# Patient Record
Sex: FEMALE | Race: WHITE
[De-identification: names, ages, dates, MRNs, and addresses within clinical notes are randomized per-mention and may not be internally consistent; named-entity substitution may affect disease eponyms.]

---

## 2017-10-02 ENCOUNTER — HOSPITAL ENCOUNTER (EMERGENCY)
Dept: HOSPITAL 84 - D.ER | Age: 42
Discharge: HOME | End: 2017-10-02
Payer: MEDICAID

## 2017-10-02 VITALS — BODY MASS INDEX: 28.4 KG/M2

## 2017-10-02 DIAGNOSIS — F17.200: ICD-10-CM

## 2017-10-02 DIAGNOSIS — R10.12: Primary | ICD-10-CM

## 2017-10-02 DIAGNOSIS — N39.0: ICD-10-CM

## 2017-10-02 LAB
APPEARANCE UR: CLEAR
BACTERIA #/AREA URNS HPF: (no result) /HPF
BILIRUB SERPL-MCNC: NEGATIVE MG/DL
COLOR UR: YELLOW
GLUCOSE SERPL-MCNC: NEGATIVE MG/DL
HCG UR QL: NEGATIVE
KETONES UR STRIP-MCNC: NEGATIVE MG/DL
MUCOUS THREADS #/AREA URNS LPF: (no result) /LPF
NITRITE UR-MCNC: NEGATIVE MG/ML
PH UR STRIP: 7 [PH] (ref 5–6)
PROT UR-MCNC: NEGATIVE MG/DL
RBC #/AREA URNS HPF: (no result) /HPF (ref 0–5)
SP GR UR STRIP: 1.01 (ref 1–1.02)
SQUAMOUS #/AREA URNS HPF: (no result) /HPF (ref 0–5)
UROBILINOGEN UR-MCNC: NORMAL MG/DL
WBC #/AREA URNS HPF: (no result) /HPF (ref 0–5)

## 2017-10-20 ENCOUNTER — HOSPITAL ENCOUNTER (OUTPATIENT)
Dept: HOSPITAL 84 - D.OPS | Age: 42
LOS: 1 days | Discharge: HOME | End: 2017-10-21
Attending: UROLOGY
Payer: MEDICAID

## 2017-10-20 VITALS — DIASTOLIC BLOOD PRESSURE: 66 MMHG | SYSTOLIC BLOOD PRESSURE: 112 MMHG

## 2017-10-20 VITALS — BODY MASS INDEX: 31.2 KG/M2

## 2017-10-20 DIAGNOSIS — Z01.812: ICD-10-CM

## 2017-10-20 DIAGNOSIS — F17.200: ICD-10-CM

## 2017-10-20 DIAGNOSIS — N20.2: Primary | ICD-10-CM

## 2017-10-20 LAB
BASOPHILS NFR BLD AUTO: 0.2 % (ref 0–2)
EOSINOPHIL NFR BLD: 1.4 % (ref 0–7)
ERYTHROCYTE [DISTWIDTH] IN BLOOD BY AUTOMATED COUNT: 12.8 % (ref 11.5–14.5)
HCG SERPL-ACNC: NEGATIVE M[IU]/ML
HCT VFR BLD CALC: 37.4 % (ref 36–48)
HGB BLD-MCNC: 12.7 G/DL (ref 12–16)
IMM GRANULOCYTES NFR BLD: 0.2 % (ref 0–5)
LYMPHOCYTES NFR BLD AUTO: 26.1 % (ref 15–50)
MCH RBC QN AUTO: 31.7 PG (ref 26–34)
MCHC RBC AUTO-ENTMCNC: 34 G/DL (ref 31–37)
MCV RBC: 93.3 FL (ref 80–100)
MONOCYTES NFR BLD: 5.7 % (ref 2–11)
NEUTROPHILS NFR BLD AUTO: 66.4 % (ref 40–80)
PLATELET # BLD: 267 10X3/UL (ref 130–400)
PMV BLD AUTO: 8.8 FL (ref 7.4–10.4)
RBC # BLD AUTO: 4.01 10X6/UL (ref 4–5.4)
WBC # BLD AUTO: 8.5 10X3/UL (ref 4.8–10.8)

## 2017-10-20 NOTE — NUR
1800 UPDATED PATIENT OF DELAY WITH SURGERY. TOLD WILL BE MOVING TO
ANOTHER ROOM 2208 THAN TO SURGERY.

## 2017-10-20 NOTE — NUR
PT IN BED WITH  AT THE BEDSIDE AWAITING SURGERY. ASSIGNED NURSE HAS
ASSISTED HER WITH ALL THE PREP SHE NEEDS FOR PROCEDURE. RESPIRATIONS EASY AND
NO DISTRESS NOTED. THE BED IS LOW, RAILS UP X'S 2 WITH THE CALL LIGHT AT HAND.

## 2017-10-20 NOTE — NUR
PT AMBULATED FROM OUTPT WITH NURSE AND  AT SIDE.  ORIENTED TO ROOM AND
CALL LIGHT.  REPORTS NO NEEDS AT THIS TIME.  CALL LIGHT AT SIDE.

## 2017-10-22 ENCOUNTER — HOSPITAL ENCOUNTER (EMERGENCY)
Dept: HOSPITAL 84 - D.ER | Age: 42
Discharge: HOME | End: 2017-10-22
Payer: MEDICAID

## 2017-10-22 VITALS — BODY MASS INDEX: 31.2 KG/M2

## 2017-10-22 DIAGNOSIS — G89.18: Primary | ICD-10-CM

## 2017-10-24 NOTE — OP
PATIENT NAME:  DREW HUERTA                         MEDICAL RECORD: [id number]
:[birth year]                                             LOCATION:D.OPS          
                                                         ADMISSION DATE:        
SURGEON:  CHRISTOPHER CORCOARN MD              
 
 
DATE OF OPERATION:  10/21/2017
 
SURGEON:  Christopher Corcoran MD
 
ANESTHESIA:  TIVA by Elroy Dumont CRNA.
 
PREOPERATIVE DIAGNOSES:  Left ureteral and renal stones, 6 mm each times 2.
 
PROCEDURES:  Cystoscopy and left ureteral stent insertion with string attached
6-French x 22 cm.
 
FINDINGS:  Radiodense stones, one in the proximal left ureter and one in the
left kidney.
 
ESTIMATED BLOOD LOSS:  Loss None.
 
CLINICAL HISTORY:  This is a 42-year-old female, who was found to have bilateral
renal stones on the CT scan in .  She did not do anything about these
stones.  Recently, she complained of left-sided flank pain.  A CT scan shows one
of the 6-mm stones in the left kidney has now gone into the left proximal
ureter.  There is still another 6-mm stone in the left kidney.  She also has a
5-mm stone, which is nonobstructive in the right kidney.  She comes now to have
a left ureteral stent inserted and we will treat her stones with ESWL in the
future.  She was given Ancef IV on call to the OR.
 
DESCRIPTION OF PROCEDURE:  The patient was given IV sedation.  She was then
placed in the dorsal lithotomy position and prepped and draped.  A 21-French
cystoscope with 30-degree lens was used for visualization.  She has single
ureteral orifices on each side.  No bladder tumors were seen.  On fluoroscopy,
the stones were easily seen as they are quite radiodense.  A Sensor wire was
placed into the left ureteral orifice and placed up into the left renal pelvis. 
Over the wire, a 6-French x 22 cm ureteral stent was inserted.  Once the
proximal end of the stent was within the kidney, the wire was entirely
withdrawn.  The distal end of the stent was pushed into the bladder using the
pusher.  The bladder was then emptied through the cystoscope, sheath, and then
the scope was entirely removed.  The string on the distal end of the stent is
maintained.  It was taped to the patient's suprapubic area with a small piece of
Tegaderm.  The patient was awakened and brought to recovery room.  She will be
sent home.  I will arrange for outpatient lithotripsy in the near future.
 
TRANSINT:[id number] Voice Confirmation ID: [id number] DOCUMENT ID: [id number]
                                           
                                           CHRISTOPHER CORCORAN MD              
 
 
 
Electronically Signed by CHRISTOPHER CORCORAN on 10/24/17 at 1057
CC:                                                             [id number]
DICTATION DATE: 10/21/17 0018     :     10/21/17 0105      [facility] 
                                                                      10/21/17
[place] [id number] [place] [id number]

## 2017-10-26 ENCOUNTER — HOSPITAL ENCOUNTER (OUTPATIENT)
Dept: HOSPITAL 84 - D.OPS | Age: 42
Discharge: HOME | End: 2017-10-26
Attending: UROLOGY
Payer: MEDICAID

## 2017-10-26 VITALS — BODY MASS INDEX: 30.3 KG/M2

## 2017-10-26 DIAGNOSIS — Z01.812: ICD-10-CM

## 2017-10-26 DIAGNOSIS — N20.0: Primary | ICD-10-CM

## 2017-10-26 DIAGNOSIS — N20.1: ICD-10-CM

## 2017-10-26 NOTE — OP
PATIENT NAME:  DREW HUERTA                         MEDICAL RECORD: [id number]
:[birth year]                                             LOCATION:D.OPS          
                                                         ADMISSION DATE:        
SURGEON:  CHRISTOPHER CORCORAN MD              
 
 
DATE OF OPERATION:  10/26/2017
 
SURGEON:  Christopher Corcoran MD
 
ANESTHESIA:  TIVA by Selam Ureña CRNA
 
PREOPERATIVE DIAGNOSES:  Left proximal ureteral stone, 6 mm and left renal
stone, 6 mm.
 
PROCEDURE:  Left ESWL times 3000 shocks.
 
FINDINGS:  Radiodense stone.
 
BLOOD LOSS:  None.
 
CLINICAL HISTORY:  This is a 42-year-old female with a prior history of kidney
stones.  She has a strong family history of kidney stones in her sister, her
brother, and her mother.  Recently, she had left renal colic from a renal stone,
which went into the UP junction.  I have inserted a left ureteral stent.  She
comes now to have lithotripsy to try to clear out the proximal ureteral stone. 
She was given Ancef on call to the OR.
 
DESCRIPTION OF PROCEDURE:  The patient was placed on treatment table.  The
stones were radiodense and easily targeted.  We targeted the proximal ureteral
stone on the left side in 2 planes.  Then, 3000 shocks were given to the stone. 
The stone did not visibly appear to be altered and so it may not have broken up
at all.  I will see her in the office in 1 week's time with a KUB.  If the stone
was untouched by the lithotripsy, then we may have to arrange for a percutaneous
nephrolithotomy to get the stones out.
 
TRANSINT:[id number] Voice Confirmation ID: [id number] DOCUMENT ID: [id number]
                                           
                                           CHRISTOPHER CORCORAN MD              
 
 
 
Electronically Signed by CHRISTOPHER CORCORAN on 10/26/17 at 1441
 
 
 
 
 
 
 
CC:                                                             [id number]
DICTATION DATE: 10/26/17 1348     :     10/26/17 1354      [facility]                                          
[address] [place] [address]

## 2017-11-03 ENCOUNTER — HOSPITAL ENCOUNTER (OUTPATIENT)
Dept: HOSPITAL 84 - D.RAD | Age: 42
Discharge: HOME | End: 2017-11-03
Attending: UROLOGY
Payer: MEDICAID

## 2017-11-03 VITALS — BODY MASS INDEX: 30.3 KG/M2

## 2017-11-03 DIAGNOSIS — N20.0: Primary | ICD-10-CM

## 2017-11-10 ENCOUNTER — HOSPITAL ENCOUNTER (OUTPATIENT)
Dept: HOSPITAL 84 - D.LABREF | Age: 42
Discharge: HOME | End: 2017-11-10
Attending: UROLOGY
Payer: MEDICAID

## 2017-11-10 VITALS — BODY MASS INDEX: 30.3 KG/M2

## 2017-11-10 DIAGNOSIS — N39.0: Primary | ICD-10-CM

## 2017-11-17 ENCOUNTER — HOSPITAL ENCOUNTER (OUTPATIENT)
Dept: HOSPITAL 84 - D.LABREF | Age: 42
Discharge: HOME | End: 2017-11-17
Attending: INTERNAL MEDICINE
Payer: MEDICAID

## 2017-11-17 VITALS — BODY MASS INDEX: 30.3 KG/M2

## 2017-11-17 DIAGNOSIS — N39.0: Primary | ICD-10-CM

## 2017-11-29 LAB
ANION GAP SERPL CALC-SCNC: 12.6 MMOL/L (ref 8–16)
APTT BLD: 25.8 SECONDS (ref 22.8–39.4)
BASOPHILS NFR BLD AUTO: 0.2 % (ref 0–2)
BUN SERPL-MCNC: 15 MG/DL (ref 7–18)
CALCIUM SERPL-MCNC: 8.9 MG/DL (ref 8.5–10.1)
CHLORIDE SERPL-SCNC: 103 MMOL/L (ref 98–107)
CO2 SERPL-SCNC: 26.2 MMOL/L (ref 21–32)
CREAT SERPL-MCNC: 0.8 MG/DL (ref 0.6–1.3)
EOSINOPHIL NFR BLD: 1.6 % (ref 0–7)
ERYTHROCYTE [DISTWIDTH] IN BLOOD BY AUTOMATED COUNT: 13 % (ref 11.5–14.5)
GLUCOSE SERPL-MCNC: 105 MG/DL (ref 74–106)
HCT VFR BLD CALC: 36.9 % (ref 36–48)
HGB BLD-MCNC: 12.5 G/DL (ref 12–16)
IMM GRANULOCYTES NFR BLD: 0.1 % (ref 0–5)
INR PPP: 0.97 (ref 0.85–1.17)
LYMPHOCYTES NFR BLD AUTO: 21.8 % (ref 15–50)
MCH RBC QN AUTO: 31.7 PG (ref 26–34)
MCHC RBC AUTO-ENTMCNC: 33.9 G/DL (ref 31–37)
MCV RBC: 93.7 FL (ref 80–100)
MONOCYTES NFR BLD: 7 % (ref 2–11)
NEUTROPHILS NFR BLD AUTO: 69.3 % (ref 40–80)
OSMOLALITY SERPL CALC.SUM OF ELEC: 276 MOSM/KG (ref 275–300)
PLATELET # BLD: 273 10X3/UL (ref 130–400)
PMV BLD AUTO: 8.6 FL (ref 7.4–10.4)
POTASSIUM SERPL-SCNC: 3.8 MMOL/L (ref 3.5–5.1)
PROTHROMBIN TIME: 12.5 SECONDS (ref 11.6–15)
RBC # BLD AUTO: 3.94 10X6/UL (ref 4–5.4)
SODIUM SERPL-SCNC: 138 MMOL/L (ref 136–145)
WBC # BLD AUTO: 9.8 10X3/UL (ref 4.8–10.8)

## 2017-11-30 ENCOUNTER — HOSPITAL ENCOUNTER (OUTPATIENT)
Dept: HOSPITAL 84 - D.MS | Age: 42
LOS: 1 days | Discharge: HOME | End: 2017-12-01
Attending: UROLOGY
Payer: MEDICAID

## 2017-11-30 VITALS — SYSTOLIC BLOOD PRESSURE: 96 MMHG | DIASTOLIC BLOOD PRESSURE: 62 MMHG

## 2017-11-30 VITALS — DIASTOLIC BLOOD PRESSURE: 62 MMHG | SYSTOLIC BLOOD PRESSURE: 96 MMHG

## 2017-11-30 VITALS
HEIGHT: 61 IN | BODY MASS INDEX: 28.38 KG/M2 | BODY MASS INDEX: 28.38 KG/M2 | BODY MASS INDEX: 28.38 KG/M2 | WEIGHT: 150.32 LBS

## 2017-11-30 VITALS — DIASTOLIC BLOOD PRESSURE: 70 MMHG | SYSTOLIC BLOOD PRESSURE: 100 MMHG

## 2017-11-30 VITALS — DIASTOLIC BLOOD PRESSURE: 52 MMHG | SYSTOLIC BLOOD PRESSURE: 100 MMHG

## 2017-11-30 DIAGNOSIS — N20.2: Primary | ICD-10-CM

## 2017-11-30 DIAGNOSIS — Z01.812: ICD-10-CM

## 2017-11-30 DIAGNOSIS — Z87.442: ICD-10-CM

## 2017-11-30 LAB — HCG UR QL: NEGATIVE

## 2017-11-30 NOTE — NUR
CYSTO DONE PRIOR TO TURNING FOR PERC NEPHRO PROCEDURE, SAFETY
MEASURES TAKEN PRESSURE POINTS PADDED AND SEURED ALL AREAS CHECKED
FOR IMPINGEMENT NONE FOUND, DR CORCORAN PRESENT FOR POSITIONING, [facility].

## 2017-11-30 NOTE — HEMODYNAMI
PATIENT:DREW HUERTA                                MEDICAL RECORD: [id number]
: [birth year]                                            LOCATION:D.OPS          
ACCT# [id number]                                       ADMISSION DATE: 17
 
 
 Generatedon:20179:30
Patient name: DREW HUERTA Patient #: [id number] Visit #: [id number] SSN: YOB: 1975 Date of study: 2017
Page: Of
Hemodynamic Procedure Report
****************************
Patient Data
Patient Demographics
Procedure consent was obtained
First Name: DREW            Gender: Female
Last Name: BRADLEY       : 1975
Middle Initial: A           Age: 42 year(s)
Patient #: [id number]       Race: Unknown
Visit #: [id number]
Accession #:
[id number]
Additional ID: [id number]
Contact details
Address: [address] [place] [address]    Phone: [phone]
[place] [address]
State: [place]
City: [place]
Zip code: [address]
Past Medical History
Allergies: No known allergies
Admission
Admission Data
Admission Date: 2017  Admission Time: 5:55
Procedure
Procedure Types
Cath Procedure
Peripheral Cath Diagnostic Procedure
Cath Peripheral
Nephro
Nephrostomy Tubes
Procedure Description
Procedure Date
Procedure Date: 2017
Procedure Start Time: 9:01
Procedure Staff
Name                            Function
Phoenix Morales MD                  Performing Physician
Nessa Duran RT                  Monitor
Justin Lott RT              Scrub
Jennifer Pearce RN                Nurse
Nessa Duran RT                  Circulator
Procedure Data
Cath Procedure
Fluoroscopy
Diagnostic fluoroscopy      Total fluoroscopy Time: 3
time: 3 min                 min
Diagnostic fluoroscopy      Total fluoroscopy dose: 103
dose: 103 mGy               mGy
 
Contrast Material
Contrast Material Type                       Amount (ml)
Isovue 300                                   25
Procedure Medications
Medication           Administration Route Dosage
Oxygen               NC                   3 l/min
Lidocaine 1%         added to field       20
Heparin Flush Bag                         2 bags
(1000units/500ml NS)
Versed               I.V.                 1 mg
Fentanyl             I.V.                 50 mcg
Versed               I.V.                 1 mg
Fentanyl             I.V.                 50 mcg
Versed               I.V.                 1 mg
Fentanyl             I.V.                 50 mcg
Versed               I.V.                 1 mg
Fentanyl             I.V.                 50 mcg
Hemodynamics
Rest
Heart Rate: 79 (bpm)
Snapshots
Pre Cath      Intra         NCS           Post Cath
Vital Signs
Time    Heart  Resp   SPO2 etCO2   NIBP (mmHg) Rhythm  Pain    Sedation
Rate   (ipm)  (%)  (mmHg)                      Status  Level
(bpm)
8:40:10 70     14     100  27.9    113/77(96)  NSR     0 (11)  10(A)
, No
pain
8:44:21 74     9      100  33.1    111/79(105) NSR     0 (11)  10(A)
, No
pain
8:48:31 75     10     100  33.2    108/76(86)  NSR     0 (11)  10(A)
, No
pain
8:52:43 76     12     100  32.4    128/70(95)  NSR     0 (11)  10(A)
, No
pain
8:57:05 69     11     100  34.7    108/65(97)  NSR     0 (11)  10(A)
, No
pain
9:02:04 68     11     100  34.7    116/71(99)  NSR     0 (11)  10(A)
, No
pain
9:06:18 67     14     100  33.9    115/74(94)  NSR     0 (11)  10(A)
, No
pain
9:10:30 77     18     100  30.2    102/74(82)  NSR     0 (11)  10(A)
, No
pain
9:14:38 75     14     100  37.7    107/72(85)  NSR     0 (11)  10(A)
, No
pain
9:18:48 70     12     99   36.9    112/70(91)  NSR     0 (11)  10(A)
, No
pain
9:22:56 80     13     99   30.9    122/83(95)  NSR     0 (11)  10(A)
, No
pain
9:27:02 81     21     98   6.7     114/79(93)  NSR     0 (11)  10(A)
 
, No
pain
Medications
Time    Medication       Route  Dose  Verified Delivered Reason     Notes  Effec
tiveness
by       by
8:39:57 Oxygen           NC     3     Jennifer Rodriguez    used for
l/min Ramses Pearce RN procedure
RN
8:40:15 Lidocaine 1%     added  20ml  Jennifer Garibay      for local
to     vial  Ramses Morales MD anesthetic
field        RN
8:41:03 Heparin Flush           2     Jennifer Garibay      used for
Bag                     bags  Ramses Morales MD procedure
(1000units/500ml              RN
NS)
9:04:36 Versed           I.V.   1 mg  Phoenix     Jennifer    for
Ramses Morales RN sedation
MD
9:05:30 Fentanyl         I.V.   50    Phoenix     Jennifer    for
mcg   Ramses Morales RN sedation
MD
9:11:13 Versed           I.V.   1 mg  Phoenix     Jennifer    for
Ramses Morales RN sedation
MD
9:11:22 Fentanyl         I.V.   50    Phoenix     Jennifer    for
mcg   Ramses Morales RN sedation
MD
9:19:20 Versed           I.V.   1 mg  Phoenix     Jennifer    for
Ramses Morales RN sedation
MD
9:19:28 Fentanyl         I.V.   50    Phoenix     Jennifer    for
mcg   Ramses Morales RN sedation
MD
9:26:32 Versed           I.V.   1 mg  Phoenix     Jennifer    for
Ramses Morales RN sedation
MD
9:26:41 Fentanyl         I.V.   50    Phoenix     Jennifer    for
mcg   Ramses Morales RN sedation
MD
Procedure Log
Time    Note
8:27:37 Use device set IR Diagnostic
8:27:39 Sterile Angiographic Pack opened to sterile field.
8:27:40 Bag Decanter (2002S) opened to sterile field.
8:27:52 KIT, INTRODUCER ACCUSTICK II W/C opened to sterile field.
8:28:03 Time tracking: Regular hours
8:28:19 Plan of Care:Hemodynamics will remain stable., Cardiac rhythm will
remain stable., Comfort level will be maintained., Respiratory function
will remain adequate., Patient/ family verbilizes understanding of
procedure., Procedure tolerated without complication., Recovers from
procedure without complications..
8:28:44 Patient received from Outpatients to IR Alert and oriented. Tansferred
to table in Supine position.
8:29:00 Signed procedure consent form obtained from patient.
8:29:08 H&P Date Dictated: 2017 Within 30 days and on chart..
8:29:11 Pre-procedure instructions explained to patient.
8:29:11 Pre-op teaching completed and patient verbalized understanding.
8:29:13 Family in waiting room.
8:29:16 Patient NPO since Midnight.
 
8:29:57 Patient allergic to No known allergies
8:30:03 Is the patient allergic to Iodine/contrast media? No.
8:30:06 Is patient on blood thinner?No
8:30:09 Patient diabetic? No.
8:30:12 ------------------------------------------------------------------------
8:30:13 ----Pre-sedation anethsthesia assessment.----
8:30:16 Previous problem with sedation/anesthesia? No ?
8:30:19 Snore? No
8:30:21 Sleep apnea? No
8:30:25 Deviated septum? No
8:30:27 Opens mouth fully? Yes
8:30:29 Sticks out tongue? Yes
8:30:32 Airway obstruction? No ?
8:30:36 Dentures? No ?
8:30:51 IV patent on arrival in left hand with 0.9% NaCl at [facility].
8:30:57 ------------------------------------------------------------------------
8:38:40 ECG and BP/O2 sat monitors applied to patient.
8:38:41 Vital chart was started
8:38:42 Baseline sample Acquired.
8:38:43 Full Disclosure recording started
8:38:44 ------------------------------------------------------------------------
8:39:00 Left renal area was prepped with chlora-prep and draped in sterile
fashion
8:39:57 Oxygen 3 l/min NC was administered by Jennifer Pearce RN; used for
procedure;
8:40:15 Lidocaine 1% 20ml vial added to field was administered by Phoenix Morales MD; for local anesthetic;
8:41:03 Heparin Flush Bag (1000units/500ml NS) 2 bags was administered by Phoenix Morales MD; used for procedure;
8:59:00 Physician arrived
9:00:31 --------ALL STOP TIME OUT------
9:00:32 Final Timeout: patient, procedure, and site verified with staff and
physician. All members of the team are in agreement.
9:00:40 Sedation plan: IV Moderate Sedation Medication:Versed, Fentanyl
9:00:45 Patient pain scale 0/10 ?.
9:00:50 ------------------------------------------------------------------------
9:01:06 Procedure started.
9:01:23 Local anesthetic to Lt renal area with Lidocaine 1% by Phoenix Morales MD.**INITIAL ACCESS ONLY**
9:04:36 Versed 1 mg I.V. was administered by Jennifer Pearce RN; for sedation;
9:05:30 Fentanyl 50 mcg I.V. was administered by Jennifer Pearce RN; for sedation;
9:06:22 CHIBA 22 X 15 needle opened to sterile field.
9:06:53 ultrasound guidance used
9:11:13 Versed 1 mg I.V. was administered by Jennifer Pearce RN; for sedation;
9:11:22 Fentanyl 50 mcg I.V. was administered by Jennifer Pearce RN; for sedation;
9:19:20 Versed 1 mg I.V. was administered by Jennifer Pearce RN; for sedation;
9:19:28 Fentanyl 50 mcg I.V. was administered by Jennifer Pearce RN; for sedation;
9:21:33 Terumo TORQUE DEVICE PLASTIC .038 opened to sterile field.
9:21:39 Terumo ANGLE 180L glide wire opened to sterile field.
9:23:21 Terumo 5FR ANGLED 65CM glide catheter opened to sterile field.
9:23:37 Terumo Angled SS 180cm glide wire opened to sterile field.
9:26:13 Tegaderm 4 x 4 (1626W) opened to sterile field.
9:26:32 Versed 1 mg I.V. was administered by Jennifer Pearce RN; for sedation;
9:26:41 Fentanyl 50 mcg I.V. was administered by Jennifer Pearce RN; for sedation;
9:27:14 5fr glidecath placed in the left kidney
9:28:12 Procedure ended.(Physican Out)
9:28:27 Fluoroscopy time 03.00 minutes.
9:28:35 Fluoroscopy dose: 103 mGy
9:28:35 Flurop Dose total: 103
9:28:39 Contrast amount:Isovue 300 25ml.
 
9:28:45 Procedure and supply charges have been captured, reviewed, submitted and
are correct.
9:28:52 Report given to Outpatients.
9:29:03 Patient transfered to Outpatients with Stretcher.
9:29:59 Vital chart was stopped
Device Usage
Item Name    Manufacture  Quantity  Catalog     Hospital Part    Current Minimal
 Lot# /
Number      Charge   Number  Stock   Stock   Serial#
Code
Sterile      Cardinal     1         [id number]  [id number]           [id number]  5
[employer]
Pack
Bag Decanter Micro[employer]     1         S       [id number]   95276   730753  5
(S)      [employer]
KIT,         Bend       1         [id number]  867755   261490  649718  5
INTRODUCER   Scientific
ACCUSTICK II
W/C
CHIBA 22 X   [employer] 1         [id number]      [id number]           083007  5
15 needle
Terumo       Bend       1         TD01        467919   692522  241934  5
TORQUE       Scientific
DEVICE
PLASTIC .038
Terumo ANGLE Terumo       1         [id number]      950632   410526  918191  5
180L glide
wire
Terumo 5FR   Terumo       1                482370           612689  5
ANGLED 65CM
glide
catheter
Terumo       Terumo       1         TW6098      904879           717893  5
Angled SS
180cm glide
wire
Tegaderm 4 x 3M           1         1626W       613278   107146  821922  5
4 (1626W)
Signature Audit [place]
Stage           Time        Signature      Unsigned
Intra-Procedure 2017  Nessa Duran
9:29:56 AM  RT(R)
Signatures
Monitor : Nessa Duran RT Signature :
______________________________
Date : ______________ Time :
______________
 
 
 
 
 
 
 
 
[facility]                                          
[address] [place] [address]

## 2017-11-30 NOTE — NUR
PATIENT WAS POSITIONED LITHOTOMY AFTER PERC NEPHRO FOR CYSTO
URETEROSCOPY, PATIENT MOVED WITH CARE AND PRECAUTIONS, DR CORCORAN PRESENT
FOR DERRELL ZHANG

## 2017-12-01 VITALS — SYSTOLIC BLOOD PRESSURE: 131 MMHG | DIASTOLIC BLOOD PRESSURE: 77 MMHG

## 2017-12-01 VITALS — DIASTOLIC BLOOD PRESSURE: 70 MMHG | SYSTOLIC BLOOD PRESSURE: 109 MMHG

## 2017-12-01 VITALS — SYSTOLIC BLOOD PRESSURE: 104 MMHG | DIASTOLIC BLOOD PRESSURE: 62 MMHG

## 2017-12-01 VITALS — DIASTOLIC BLOOD PRESSURE: 70 MMHG | SYSTOLIC BLOOD PRESSURE: 106 MMHG

## 2017-12-01 NOTE — NUR
DC INSTRUCTIONS EXPLAINED TO PATIENT AND . BOTH VERBALIZED
UNDERSTANDING. RX FOR NORCO HANDED TO PATIENT.

## 2017-12-01 NOTE — NUR
AWAKE AND ALERT. ORIENTED X3. UP IN ROOM PER SELF. DENIES NEEDS. REPORTS PAIN
IMPROVED AT THIS TIME.

## 2017-12-01 NOTE — NUR
ASSESSMENT COMPLETED. STATES HER HEAD IS HURTING AT AN 8 ON SCALE OF 0-10.
EXPLAINED TO PATIENT THAT IT COULD BE A REBOUND HA FROM ALL OF THE PAIN MEDS
SHE HAS HAD. AM MEDS ADMINISTERED WITH NORCO PO. ASSISTED PATIENT TO CHAIR FOR
BREAKFAST BECAUSE SHE HAS NOT BEEN OOB YET. SCDs TO BLE. CALL LIGHT IN REACH.
WILL CONTINUE WITH PLAN OF CARE.

## 2017-12-01 NOTE — OP
PATIENT NAME:  DREW HUERTA                         MEDICAL RECORD: [id number]
:[birth year]                                             LOCATION:D.MS [id number]
                                                         ADMISSION DATE:        
SURGEON:  CHRISTOPHER CORCORAN MD              
 
 
DATE OF OPERATION:  2017
 
SURGEON:  Christopher Corcoran MD
 
ANESTHESIA:  General anesthesia by Evans Zayas MD
 
PREOPERATIVE DIAGNOSES:  Left renal stone, 6 mm; and radiodense left proximal
ureteral 6-mm stone.
 
POSTOPERATIVE DIAGNOSES:  Left renal stone, 6 mm; and radiodense left proximal
ureteral 6-mm stone.
 
PROCEDURES:  Cystoscopy, left percutaneous nephrolithotomy, left ureteroscopy
and holmium laser lithotripsy, stone extraction, and left nephrostomy tube
insertion.
 
FINDINGS:  Radiodense stones.  Cluster of small rounded beads in the kidney,
adding up to 6 mm in size.  In the ureter, was a very hard impacted
calcification.
 
ESTIMATED BLOOD LOSS:  Less than 100 mL.
 
CLINICAL HISTORY:  This is a 42-year-old female with a prior history of kidney
stones.  She initially presented with acute left flank pain due to a 6-mm stone
lodged in the proximal ureter.  There was also a nonobstructive 6-mm stone,
which was radiodense in the left kidney.  She had a left ureteral stent inserted
and we took her to lithotripsy.  The lithotripsy did not break either the stones
up at all on followup KUB.  She comes now to have a left percutaneous
nephrolithotomy with view to taking out the stone in the kidney and also to
remove the stone in the proximal ureter by ureteroscopy in an antegrade fashion.
 Earlier today, she had placement of a nephroureteral catheter which goes from
the skin into the left kidney, down the ureter, and into the bladder.  Her
current stent is also still present.  She was given Ancef on call to the OR. 
She is not allergic to any medication.
 
DESCRIPTION OF PROCEDURE:  The patient was initially prepped and draped in
supine position after having been given general anesthetic.  She was frog legged
and then we performed cystoscopy with rigid cystoscope.  The old ureteral stent
was removed entirely.  The nephroureteral access tube was drawn out through the
urethra.  It was in the position between the legs.  We then turned over into
prone position.  She was then reprepped and redraped.  The nephroureteral tube
was accessed using an Amplatz Super Stiff wire.  Once this wire was down through
the nephroureteral tube and out through its exit between the legs, a clamp was
applied to the distal end of the wire with a hemostat.  The nephroureteral
access sheath could then be completely removed without losing our access.  A
small incision was made on either side of the wire sufficient to allow our
working sheath to be placed.  A dual lumen catheter was placed down the proximal
ureter and a second wire, which was a Sensor wire, was placed down the ureter
into the bladder.  This acts as a safety wire.  The Sensor wire was clamped to
the drapes and we worked over the Super Stiff wire.  A NephroMax tract dilation
balloon was used to dilate the tract with 20 atmospheres of pressure.  The
30-French working sheath was then placed over the balloon into position.  Going
 
 
 
OPERATIVE REPORT                               [id number]    DREW HUERTA       
 
 
into the kidney, we initially had a lot of difficulty visualizing the stone in
the kidney.  Finally, I took a flexible nephroscope and looked into each of the
calices.  These did not show any obvious stones.  Eventually, as we reverted
back to the rigid nephroscope, we identified some apparent material stuck under
the mucosa of the renal pelvis.  As we unroofed this mucosa by abrading the
scope against it, a large cluster of small rounded beads came out.  These were
removed using the ultrasonic probe and as well by just using grasping forceps. 
We then turned our attention to the ureteral stone.  The flexible nephroscope
was too large to go down the UP junction.  We switched over to the flexible
ureteroscope.  We went down to the level of the stone.  Attempts to basket the
stone were unsuccessful as the stone was completely impacted in the ureter.  A
365 micron holmium laser fiber was then placed and we fragmented the stone using
holmium laser energy.  However, the basket still would not pass from an
antegrade fashion.  At this point, I decided to place the patient's nephrostomy
tube and try to complete the ureteroscopy from below.  Therefore, the 22-French
Malecot nephrostomy tube was placed into the renal pelvis.  The working sheath
was removed.  The Super Stiff wire was entirely removed by pulling it distally
using the clamp on the wire.  The nephrostomy tube was sutured to the skin using
a 2-0 nylon stitch.  Dressings were applied and the nephrostomy tube was put to
bag drainage.  The patient was then turned back into supine position using a
stretcher.  She was then placed into dorsal lithotomy position.  She was
reprepped and redraped.  Using cystoscope and grasping forceps, I was able to
pull out the Sensor wire which had acted as our safety wire earlier.  A clamp
was put on it.  This is to prevent the wire from being lost.  The rigid
ureteroscope was used to follow the wire.  Since the patient had a ureteral
stent in for some period of time, the ureter was nice and dilated.  Going into
the level of the stone, I managed to push it proximally using the tip of the
scope.  A 0-tip 4-wire basket was then able to completely encompass the stone. 
The stone within the basket and with the scope as a unit were moved distally and
the stone was finally completely extracted.  A repeat look with the ureteroscope
revealed no further stone fragments in the ureter.  At this point, the bladder
was emptied.  the Sensor wire was entirely removed by pulling it proximally
through the back.  The patient was brought to recovery room.
 
TRANSINT:[id number] Voice Confirmation ID: [id number] DOCUMENT ID: [id number]
                                           
                                           CHRISTOPHER CORCORAN MD              
 
 
 
Electronically Signed by CHRISTOPHER CORCORAN on 17 at 1113
 
 
 
 
 
 
CC:                                                             [id number]
DICTATION DATE: 17 1526     :     17 191      [facility]                                          
[address] [place] [address]

## 2017-12-04 ENCOUNTER — HOSPITAL ENCOUNTER (EMERGENCY)
Dept: HOSPITAL 84 - D.ER | Age: 42
Discharge: HOME | End: 2017-12-04
Payer: MEDICAID

## 2017-12-04 VITALS — BODY MASS INDEX: 28.4 KG/M2

## 2017-12-04 DIAGNOSIS — G89.18: Primary | ICD-10-CM

## 2017-12-04 DIAGNOSIS — F17.200: ICD-10-CM

## 2017-12-04 DIAGNOSIS — N39.0: ICD-10-CM

## 2017-12-04 LAB
APPEARANCE UR: CLEAR
BACTERIA #/AREA URNS HPF: (no result) /HPF
BILIRUB SERPL-MCNC: NEGATIVE MG/DL
COLOR UR: (no result)
GLUCOSE SERPL-MCNC: NEGATIVE MG/DL
KETONES UR STRIP-MCNC: NEGATIVE MG/DL
MUCOUS THREADS #/AREA URNS LPF: (no result) /LPF
NITRITE UR-MCNC: NEGATIVE MG/ML
PH UR STRIP: 6 [PH] (ref 5–6)
PROT UR-MCNC: NEGATIVE MG/DL
RBC #/AREA URNS HPF: (no result) /HPF (ref 0–5)
SP GR UR STRIP: 1.01 (ref 1–1.02)
SQUAMOUS #/AREA URNS HPF: (no result) /HPF (ref 0–5)
UROBILINOGEN UR-MCNC: NORMAL MG/DL
WBC #/AREA URNS HPF: (no result) /HPF (ref 0–5)

## 2017-12-11 LAB
CA H2 PHOS DIHYD MFR STONE: 10 %
CA PHOS MFR STONE: 35 %
CALCIUM OXALATE DIHYDRATE MFR STONE IR: 25 %
COM MFR STONE: 30 %
WT STONE: 103 MG

## 2017-12-28 ENCOUNTER — HOSPITAL ENCOUNTER (OUTPATIENT)
Dept: HOSPITAL 84 - D.LAB | Age: 42
Discharge: HOME | End: 2017-12-28
Attending: UROLOGY
Payer: MEDICAID

## 2017-12-28 VITALS — BODY MASS INDEX: 28.4 KG/M2

## 2017-12-28 DIAGNOSIS — E21.3: Primary | ICD-10-CM

## 2018-06-21 ENCOUNTER — HOSPITAL ENCOUNTER (OUTPATIENT)
Dept: HOSPITAL 84 - D.MAMMO | Age: 43
Discharge: HOME | End: 2018-06-21
Attending: FAMILY MEDICINE
Payer: COMMERCIAL

## 2018-06-21 VITALS — BODY MASS INDEX: 28.4 KG/M2

## 2018-06-21 DIAGNOSIS — Z12.31: Primary | ICD-10-CM

## 2018-07-02 ENCOUNTER — HOSPITAL ENCOUNTER (OUTPATIENT)
Dept: HOSPITAL 84 - D.MAMMO | Age: 43
Discharge: HOME | End: 2018-07-02
Attending: FAMILY MEDICINE
Payer: COMMERCIAL

## 2018-07-02 VITALS — BODY MASS INDEX: 28.4 KG/M2

## 2018-07-02 DIAGNOSIS — R92.8: Primary | ICD-10-CM
